# Patient Record
Sex: FEMALE | Race: OTHER | NOT HISPANIC OR LATINO | Employment: FULL TIME | URBAN - METROPOLITAN AREA
[De-identification: names, ages, dates, MRNs, and addresses within clinical notes are randomized per-mention and may not be internally consistent; named-entity substitution may affect disease eponyms.]

---

## 2023-08-30 ENCOUNTER — TELEPHONE (OUTPATIENT)
Dept: CARDIOLOGY | Facility: HOSPITAL | Age: 43
End: 2023-08-30

## 2023-10-17 PROBLEM — R25.3 BENIGN FASCICULATIONS: Status: ACTIVE | Noted: 2023-10-17

## 2023-10-17 PROBLEM — G51.32 HEMIFACIAL SPASM OF LEFT SIDE OF FACE: Status: ACTIVE | Noted: 2023-10-17

## 2023-10-17 PROBLEM — G51.4 EYELID MYOKYMIA: Status: ACTIVE | Noted: 2023-10-17

## 2023-10-17 PROBLEM — G24.5 BLEPHAROSPASM: Status: ACTIVE | Noted: 2023-10-17

## 2023-10-17 PROBLEM — R79.89 LOW VITAMIN D LEVEL: Status: ACTIVE | Noted: 2023-10-17

## 2023-10-17 PROBLEM — H93.19 TINNITUS: Status: ACTIVE | Noted: 2023-10-17

## 2023-10-17 PROBLEM — R92.8 ABNORMAL MAMMOGRAM: Status: ACTIVE | Noted: 2023-10-17

## 2023-10-17 RX ORDER — CHOLECALCIFEROL (VITAMIN D3) 50 MCG
50 TABLET ORAL
COMMUNITY

## 2023-10-17 RX ORDER — BACLOFEN 10 MG/1
TABLET ORAL
COMMUNITY
Start: 2022-09-01 | End: 2024-03-04 | Stop reason: WASHOUT

## 2023-10-17 RX ORDER — AMOXICILLIN AND CLAVULANATE POTASSIUM 500; 125 MG/1; MG/1
1 TABLET, FILM COATED ORAL 2 TIMES DAILY
COMMUNITY
Start: 2023-03-18

## 2023-10-18 ENCOUNTER — PROCEDURE VISIT (OUTPATIENT)
Dept: NEUROLOGY | Facility: CLINIC | Age: 43
End: 2023-10-18
Payer: COMMERCIAL

## 2023-10-18 VITALS — BODY MASS INDEX: 29.45 KG/M2 | WEIGHT: 161 LBS

## 2023-10-18 DIAGNOSIS — G51.32 HEMIFACIAL SPASM OF LEFT SIDE OF FACE: Primary | ICD-10-CM

## 2023-10-18 PROCEDURE — 67345 DESTROY NERVE OF EYE MUSCLE: CPT | Performed by: STUDENT IN AN ORGANIZED HEALTH CARE EDUCATION/TRAINING PROGRAM

## 2023-10-18 NOTE — PROGRESS NOTES
Neurology Botulinum Injection    Subjective   Chata Vo is an 43 y.o. female here for medical botulinum injection for hemifacial spasm. Did ok last time with some lower face asymetry.    Objective   Alert oriented x4, extraocular in full, saccades normal, VOR normal, pursuit normal.  Face is somewhat asymmetric with hemifacial spasm primarily affecting her left eyelid minimal extent to face today.  No weakness noted.  VOR intact.  Visual field full.  Power intact in all 4 extremities proximally and distally.  Sensations intact in all 4 extremities proximally and distally.  No abnormal involuntary limb movements gait unremarkable.  Reflexes symmetric.  -  Procedures  After consent procedure was completed and risk and benefits were explained we injected botulinum toxin using 13-gauge half an inch needle.  We used Xeomin.  Injected 2.5 units each at three different location on top of the both eyebrows that is above the upper edge of the eyebrow.  Injected 2.5 units on the lateral aspect of the lateral canthus on each side and 2.5 units on the lateral inferior aspect of the lateral canthus on each side.  2.5 units on each  was injected.Lower face was skipped at this time.  Assessment/Plan   The patient is a 43-year-old woman with a history of hemifacial spasm here for botulinum toxin injections.  She did well last time in regards to the face however there were some residual spasms.  We injected upper face at slightly different regimen as noted above.  Lower face was given at this time.  We will follow through in 3 months.

## 2023-11-09 ENCOUNTER — PHARMACY VISIT (OUTPATIENT)
Dept: PHARMACY | Facility: CLINIC | Age: 43
End: 2023-11-09
Payer: COMMERCIAL

## 2023-12-27 ENCOUNTER — TELEPHONE (OUTPATIENT)
Dept: OBSTETRICS AND GYNECOLOGY | Facility: CLINIC | Age: 43
End: 2023-12-27
Payer: COMMERCIAL

## 2023-12-27 DIAGNOSIS — R92.8 ABNORMAL MAMMOGRAM: Primary | ICD-10-CM

## 2024-01-09 ENCOUNTER — SPECIALTY PHARMACY (OUTPATIENT)
Dept: PHARMACY | Facility: CLINIC | Age: 44
End: 2024-01-09

## 2024-01-09 PROCEDURE — RXMED WILLOW AMBULATORY MEDICATION CHARGE

## 2024-01-10 ENCOUNTER — PHARMACY VISIT (OUTPATIENT)
Dept: PHARMACY | Facility: CLINIC | Age: 44
End: 2024-01-10
Payer: COMMERCIAL

## 2024-01-15 ENCOUNTER — SPECIALTY PHARMACY (OUTPATIENT)
Dept: PHARMACY | Facility: CLINIC | Age: 44
End: 2024-01-15

## 2024-01-17 ENCOUNTER — APPOINTMENT (OUTPATIENT)
Dept: NEUROLOGY | Facility: CLINIC | Age: 44
End: 2024-01-17
Payer: COMMERCIAL

## 2024-01-17 ENCOUNTER — PROCEDURE VISIT (OUTPATIENT)
Dept: NEUROLOGY | Facility: CLINIC | Age: 44
End: 2024-01-17
Payer: COMMERCIAL

## 2024-01-17 DIAGNOSIS — G51.32 HEMIFACIAL SPASM OF LEFT SIDE OF FACE: Primary | ICD-10-CM

## 2024-01-17 PROCEDURE — 64615 CHEMODENERV MUSC MIGRAINE: CPT | Performed by: STUDENT IN AN ORGANIZED HEALTH CARE EDUCATION/TRAINING PROGRAM

## 2024-01-17 PROCEDURE — 99214 OFFICE O/P EST MOD 30 MIN: CPT | Performed by: STUDENT IN AN ORGANIZED HEALTH CARE EDUCATION/TRAINING PROGRAM

## 2024-01-17 NOTE — PROGRESS NOTES
The patient is here for follow up of BoNT for blepherospasm and more so hemifacial spasm on the left side. We initially had tried bilaterally assymetric injections but lately have been doing bilaterally symmetric. Last time skipped lower face, and she was in general happy with the outcome. She did have some lingering spasms, but less frequent and mild.    Exam:  No assymetry, no hamifacial spasm today, minimal with provoking, I looked at videos which showed mostly on the left side movements.  Power intact in all four, sensations intac t in all four. No abnormal involuntary movements, gait normal.    Procedure  Neurology Botulinum Injection    -  Procedures  After consent procedure was completed and risk and benefits were explained we injected botulinum toxin using 13-gauge half an inch needle.  We used Xeomin.  Injected 3.0 units each at three different location on top of the both eyebrows that is above the upper edge of the eyebrow.  Injected 3.0 units on the lateral aspect of the lateral canthus on each side and 3.0 units on the lateral inferior aspect of the lateral canthus on each side.  3.0 units on each  was injected.Lower face was skipped at this time.  Assessment/Plan   The patient is a 43-year-old woman with a history of hemifacial spasm here for botulinum toxin injections.  She did well last time in regards to the face however there were some residual spasms.  We injected upper face at slightly different regimen as noted above, overall she got 3 units instead of 2.5 (20% more).  Lower face was skipped at this time.  We will follow through in 3 months.

## 2024-01-22 ENCOUNTER — HOSPITAL ENCOUNTER (OUTPATIENT)
Dept: RADIOLOGY | Facility: HOSPITAL | Age: 44
Discharge: HOME | End: 2024-01-22

## 2024-01-22 VITALS — WEIGHT: 160 LBS | BODY MASS INDEX: 29.26 KG/M2

## 2024-01-22 DIAGNOSIS — R92.8 ABNORMAL MAMMOGRAM: ICD-10-CM

## 2024-01-22 PROCEDURE — 6100000003 BI MR BREAST BILATERAL WITH CONTRAST FAST SCREENING SELF PAY

## 2024-01-22 RX ORDER — GADOTERATE MEGLUMINE 376.9 MG/ML
15 INJECTION INTRAVENOUS
Status: COMPLETED | OUTPATIENT
Start: 2024-01-22 | End: 2024-01-22

## 2024-01-22 RX ADMIN — GADOTERATE MEGLUMINE 15 ML: 376.9 INJECTION INTRAVENOUS at 07:18

## 2024-01-24 ENCOUNTER — APPOINTMENT (OUTPATIENT)
Dept: NEUROLOGY | Facility: CLINIC | Age: 44
End: 2024-01-24
Payer: COMMERCIAL

## 2024-03-04 ENCOUNTER — OFFICE VISIT (OUTPATIENT)
Dept: OBSTETRICS AND GYNECOLOGY | Facility: CLINIC | Age: 44
End: 2024-03-04
Payer: COMMERCIAL

## 2024-03-04 VITALS
HEART RATE: 71 BPM | SYSTOLIC BLOOD PRESSURE: 129 MMHG | HEIGHT: 62 IN | WEIGHT: 166 LBS | BODY MASS INDEX: 30.55 KG/M2 | DIASTOLIC BLOOD PRESSURE: 88 MMHG

## 2024-03-04 DIAGNOSIS — Z01.419 WELL WOMAN EXAM WITH ROUTINE GYNECOLOGICAL EXAM: Primary | ICD-10-CM

## 2024-03-04 DIAGNOSIS — Z12.31 BREAST CANCER SCREENING BY MAMMOGRAM: ICD-10-CM

## 2024-03-04 PROCEDURE — 99396 PREV VISIT EST AGE 40-64: CPT | Performed by: OBSTETRICS & GYNECOLOGY

## 2024-03-04 PROCEDURE — 1036F TOBACCO NON-USER: CPT | Performed by: OBSTETRICS & GYNECOLOGY

## 2024-03-04 ASSESSMENT — PATIENT HEALTH QUESTIONNAIRE - PHQ9
SUM OF ALL RESPONSES TO PHQ9 QUESTIONS 1 AND 2: 0
2. FEELING DOWN, DEPRESSED OR HOPELESS: NOT AT ALL
1. LITTLE INTEREST OR PLEASURE IN DOING THINGS: NOT AT ALL

## 2024-03-04 ASSESSMENT — ENCOUNTER SYMPTOMS
CARDIOVASCULAR NEGATIVE: 0
EYES NEGATIVE: 0
ENDOCRINE NEGATIVE: 0
MUSCULOSKELETAL NEGATIVE: 0
HEMATOLOGIC/LYMPHATIC NEGATIVE: 0
NEUROLOGICAL NEGATIVE: 0
RESPIRATORY NEGATIVE: 0
ALLERGIC/IMMUNOLOGIC NEGATIVE: 0
GASTROINTESTINAL NEGATIVE: 0
CONSTITUTIONAL NEGATIVE: 0
PSYCHIATRIC NEGATIVE: 0

## 2024-03-04 ASSESSMENT — COLUMBIA-SUICIDE SEVERITY RATING SCALE - C-SSRS
2. HAVE YOU ACTUALLY HAD ANY THOUGHTS OF KILLING YOURSELF?: NO
6. HAVE YOU EVER DONE ANYTHING, STARTED TO DO ANYTHING, OR PREPARED TO DO ANYTHING TO END YOUR LIFE?: NO
1. IN THE PAST MONTH, HAVE YOU WISHED YOU WERE DEAD OR WISHED YOU COULD GO TO SLEEP AND NOT WAKE UP?: NO

## 2024-03-04 ASSESSMENT — PAIN SCALES - GENERAL: PAINLEVEL: 0-NO PAIN

## 2024-03-04 NOTE — PROGRESS NOTES
Assessment   Delmy was seen today for annual exam.  Diagnoses and all orders for this visit:  Well woman exam with routine gynecological exam (Primary)  -     CBC; Future  -     Comprehensive Metabolic Panel; Future  -     Glucose, Fasting; Future  -     Hemoglobin A1C; Future  -     Lipid Panel; Future  -     TSH with reflex to Free T4 if abnormal; Future  -     BI mammo bilateral screening tomosynthesis; Future  -     Colonoscopy Screening; Average Risk Patient; Future  Breast cancer screening by mammogram  -     BI mammo bilateral screening tomosynthesis; Future    RV 1 year    Georgina Campa MD    Subjective   40 YO who presents for an annual gynecological exam. PCP = Vick TONEY Concerns: none     Other Concerns: Pt not interested in starting meds for dysmenorrhea.     OBHx:  2010 39 weeks CS, had uterine bleeding 2/2 fibroid  2013 39 week CS elective repeat     GynHx:  Menstrual Hx: Menarche 12, cycles q28-32, last 5 days with 2 heavier days. Had one episode of IMS this past year. No menorrhagia but does have severe dysmenorrhea. No IMS. No mood swings.  STIs: denies h/o  Contraception:  s/p vasectomy  Sexual History/Complaints: Heterosexual, monogamous, no complaints     PapHx:  Dec 2017 --> ASCUS neg HRHPV   Oct 2020 --> ASCUS neg HRHPV  Nov 2021 --> neg cotest  Jan 2023 --> neg cotest     Preventative:  Last mammogram: Neg fast MRI Jan 2024. Hx right breast asymmetry seen 2020 and 2021 but BIRAD Cat 1. Pt wants to continue fast MRI bc she's had call backs multiple times a year ever since age 39 for her breast asymmetry.   Last Colonoscopy: due age 44  DM Screening: due now  DEXA: due age 64  Immunizations: UTD  Exercise: treadmill 1-2X/week  Diet: no restrictions  Seat Belt Use: always     SoHx:  Living Situation: lives with  and children ([place] [employer])  School/Employment: accepted job at [employer] in  ([place])  Substance: no T/D, social EtOH (1-2 drinks/month)  CAGE: neg  Abuse:  "denies  Depression Screen: negative     PMH, PSH reviewed:  Blepherospasm, left hemifacial spasm on the left side - getting Botox    FamHx: Mother had fibroids, s/p hysterectomy    Objective   /88   Pulse 71   Ht 1.575 m (5' 2\")   Wt 75.3 kg (166 lb)   LMP 02/17/2024 (Approximate)   BMI 30.36 kg/m²      General:   Alert and oriented, in no acute distress   Neck: Supple. No visible thyromegaly.    Breast/Axilla: Normal to palpation bilaterally without masses, skin changes, or nipple discharge.    Abdomen: Soft, non-tender, without masses or organomegaly   Vulva: Normal architecture without erythema, masses, or lesions.    Vagina: Normal mucosa without lesions, masses, or atrophy. No abnormal vaginal discharge.    Cervix: Normal without masses, lesions, or signs of cervicitis.    Uterus: Normal mobile, non-enlarged uterus    Adnexa: Normal without masses or lesions   Pelvic Floor No POP noted. No high tone pelvic floor    Psych Normal affect. Normal mood.        "

## 2024-04-04 PROCEDURE — RXMED WILLOW AMBULATORY MEDICATION CHARGE

## 2024-04-05 ENCOUNTER — SPECIALTY PHARMACY (OUTPATIENT)
Dept: PHARMACY | Facility: CLINIC | Age: 44
End: 2024-04-05

## 2024-04-08 ENCOUNTER — PHARMACY VISIT (OUTPATIENT)
Dept: PHARMACY | Facility: CLINIC | Age: 44
End: 2024-04-08
Payer: COMMERCIAL

## 2024-04-17 ENCOUNTER — PROCEDURE VISIT (OUTPATIENT)
Dept: NEUROLOGY | Facility: CLINIC | Age: 44
End: 2024-04-17
Payer: COMMERCIAL

## 2024-04-17 DIAGNOSIS — G51.32 HEMIFACIAL SPASM OF LEFT SIDE OF FACE: Primary | ICD-10-CM

## 2024-04-17 PROCEDURE — 64612 DESTROY NERVE FACE MUSCLE: CPT | Performed by: STUDENT IN AN ORGANIZED HEALTH CARE EDUCATION/TRAINING PROGRAM

## 2024-04-17 PROCEDURE — 99214 OFFICE O/P EST MOD 30 MIN: CPT | Performed by: STUDENT IN AN ORGANIZED HEALTH CARE EDUCATION/TRAINING PROGRAM

## 2024-04-17 NOTE — PROGRESS NOTES
Subjective   Chata Vo is an 43 y.o. female here for medical botulinum injection for hemifacial spasm. Did ok last time with some lower face asymetry.                    Review of Systems  All other system have been reviewed and are negative for complaint.  Objective   Neurological Exam    Physical ExamAlert oriented x4, extraocular in full, saccades normal, VOR normal, pursuit normal.  Face is somewhat asymmetric with hemifacial spasm primarily affecting her left eyelid minimal extent to face today.  No weakness noted.  VOR intact.  Visual field full.  Power intact in all 4 extremities proximally and distally.  Sensations intact in all 4 extremities proximally and distally.  No abnormal involuntary limb movements gait unremarkable.  Reflexes symmetric.  -  Procedures  After consent procedure was completed and risk and benefits were explained we injected botulinum toxin using 13-gauge half an inch needle.  We used Xeomin.  Injected 3.0 units each at three different location on top of the both eyebrows that is above the upper edge of the eyebrow.  Injected 3.0units on the lateral aspect of the lateral canthus on each side and 3.0 units on the lateral inferior aspect of the lateral canthus on each side.  3.0 units on each  was injected.Lower face was skipped at this time.     Lot;[id number]  Exp:06/2026                                   Assessment/Plan The patient is a 43-year-old woman with a history of hemifacial spasm here for botulinum toxin injections. She did well last time in regards to the face however there were some residual spasms. We injected upper face at slightly different regimen as noted above. . We will follow through in 3 months.

## 2024-07-03 ENCOUNTER — SPECIALTY PHARMACY (OUTPATIENT)
Dept: PHARMACY | Facility: CLINIC | Age: 44
End: 2024-07-03

## 2024-07-03 PROCEDURE — RXMED WILLOW AMBULATORY MEDICATION CHARGE

## 2024-07-09 ENCOUNTER — PHARMACY VISIT (OUTPATIENT)
Dept: PHARMACY | Facility: CLINIC | Age: 44
End: 2024-07-09
Payer: MEDICARE

## 2024-07-17 ENCOUNTER — APPOINTMENT (OUTPATIENT)
Dept: NEUROLOGY | Facility: CLINIC | Age: 44
End: 2024-07-17
Payer: COMMERCIAL

## 2024-07-17 DIAGNOSIS — G51.32 HEMIFACIAL SPASM OF LEFT SIDE OF FACE: Primary | ICD-10-CM

## 2024-07-17 NOTE — PROGRESS NOTES
Subjective   Chata Vo is an 43 y.o. female here for medical botulinum injection for hemifacial spasm. Patient requesting today to avoid the lower face due to smile asymmetry from last injections.       Review of Systems  All other system have been reviewed and are negative for complaint.  Objective   Neurological Exam    Physical ExamAlert oriented x4, extraocular in full, saccades normal, VOR normal, pursuit normal.  Some hemifacial spasm primarily affecting her left eyelid minimal extent to face today.  No weakness noted.  VOR intact.  Visual field full.  Power intact in all 4 extremities proximally and distally.  Sensations intact in all 4 extremities proximally and distally.  No abnormal involuntary limb movements gait unremarkable.  Reflexes symmetric.  -  Procedures  After consent procedure was completed and risk and benefits were explained we injected botulinum toxin using 13-gauge half an inch needle.  We used Xeomin.  Injected 5.0 units each at three different location on top of the both eyebrows that is above the upper edge of the eyebrow.  Injected 5.0units on the lateral aspect of the lateral canthus on each side and 5.0 units on each  was injected. Lower face was skipped at this time.     Lot; [id number]  Exp: 06/2026    Assessment/Plan The patient is a 44-year-old woman with a history of hemifacial spasm here for botulinum toxin injections. She did well last time in regards to the face.  We injected upper face as noted above. We will follow through in 3 months.

## 2024-09-18 ENCOUNTER — SPECIALTY PHARMACY (OUTPATIENT)
Dept: PHARMACY | Facility: CLINIC | Age: 44
End: 2024-09-18

## 2024-09-27 ENCOUNTER — TELEPHONE (OUTPATIENT)
Dept: NEUROLOGY | Facility: CLINIC | Age: 44
End: 2024-09-27
Payer: COMMERCIAL

## 2024-09-27 NOTE — TELEPHONE ENCOUNTER
Spoke with Kaylee ref # [id number] @[facility] [phone]... Informed me to send script to [facility] for Xeomin ... PA is patient supplied and they will deliver medication to facility.. Thank you

## 2024-09-30 DIAGNOSIS — G51.32 HEMIFACIAL SPASM OF LEFT SIDE OF FACE: Primary | ICD-10-CM

## 2024-10-04 DIAGNOSIS — E55.9 VITAMIN D DEFICIENCY: Primary | ICD-10-CM

## 2024-10-16 ENCOUNTER — APPOINTMENT (OUTPATIENT)
Dept: NEUROLOGY | Facility: CLINIC | Age: 44
End: 2024-10-16
Payer: COMMERCIAL

## 2024-10-16 DIAGNOSIS — G51.32 HEMIFACIAL SPASM OF LEFT SIDE OF FACE: Primary | ICD-10-CM

## 2024-10-16 PROCEDURE — 64612 DESTROY NERVE FACE MUSCLE: CPT | Performed by: STUDENT IN AN ORGANIZED HEALTH CARE EDUCATION/TRAINING PROGRAM

## 2024-10-16 PROCEDURE — 99213 OFFICE O/P EST LOW 20 MIN: CPT | Performed by: STUDENT IN AN ORGANIZED HEALTH CARE EDUCATION/TRAINING PROGRAM

## 2024-10-16 NOTE — PROGRESS NOTES
Chata Vo is an 44 y.o. female here for medical botulinum injection for hemifacial spasm. Patient requesting today to avoid the lower face due to smile asymmetry from last injections.        Review of Systems  All other system have been reviewed and are negative for complaint.     Neurological Exam     Physical ExamAlert oriented x4, extraocular in full, saccades normal, VOR normal, pursuit normal.  Some hemifacial spasm primarily affecting her left eyelid minimal extent to face today.  No weakness noted.  VOR intact.  Visual field full.  Power intact in all 4 extremities proximally and distally.  Sensations intact in all 4 extremities proximally and distally.  No abnormal involuntary limb movements gait unremarkable.  Reflexes symmetric.  -  Procedures  After consent procedure was completed and risk and benefits were explained we injected botulinum toxin using 13-gauge half an inch needle.  We used Xeomin.  Injected 5.0 units each at three different location on top of the both eyebrows that is above the upper edge of the eyebrow.  Injected 5.0units on the lateral aspect of the lateral canthus on each side and 5.0 units on each  was injected. Lower face was skipped at this time.        Assessment/Plan  The patient is a 44-year-old woman with a history of hemifacial spasm here for botulinum toxin injections. She did well last time in regards to the face.  We injected upper face as noted above. We will follow through in 3 months.

## 2024-11-11 ENCOUNTER — APPOINTMENT (OUTPATIENT)
Dept: GASTROENTEROLOGY | Facility: HOSPITAL | Age: 44
End: 2024-11-11
Payer: COMMERCIAL

## 2024-12-02 ENCOUNTER — LAB (OUTPATIENT)
Dept: LAB | Facility: LAB | Age: 44
End: 2024-12-02
Payer: COMMERCIAL

## 2024-12-02 DIAGNOSIS — E55.9 VITAMIN D DEFICIENCY: ICD-10-CM

## 2024-12-02 DIAGNOSIS — Z01.419 WELL WOMAN EXAM WITH ROUTINE GYNECOLOGICAL EXAM: ICD-10-CM

## 2024-12-02 DIAGNOSIS — Z01.419 WELL WOMAN EXAM WITH ROUTINE GYNECOLOGICAL EXAM: Primary | ICD-10-CM

## 2024-12-02 LAB
25(OH)D3 SERPL-MCNC: 69 NG/ML (ref 30–100)
ERYTHROCYTE [DISTWIDTH] IN BLOOD BY AUTOMATED COUNT: 13.1 % (ref 11.5–14.5)
EST. AVERAGE GLUCOSE BLD GHB EST-MCNC: 100 MG/DL
HBA1C MFR BLD: 5.1 %
HCT VFR BLD AUTO: 39.5 % (ref 36–46)
HGB BLD-MCNC: 13.2 G/DL (ref 12–16)
MCH RBC QN AUTO: 29.5 PG (ref 26–34)
MCHC RBC AUTO-ENTMCNC: 33.4 G/DL (ref 32–36)
MCV RBC AUTO: 88 FL (ref 80–100)
NRBC BLD-RTO: 0 /100 WBCS (ref 0–0)
PLATELET # BLD AUTO: 230 X10*3/UL (ref 150–450)
RBC # BLD AUTO: 4.48 X10*6/UL (ref 4–5.2)
TSH SERPL-ACNC: 2.14 MIU/L (ref 0.44–3.98)
WBC # BLD AUTO: 4.8 X10*3/UL (ref 4.4–11.3)

## 2024-12-02 PROCEDURE — 36415 COLL VENOUS BLD VENIPUNCTURE: CPT

## 2024-12-02 PROCEDURE — 82947 ASSAY GLUCOSE BLOOD QUANT: CPT

## 2024-12-02 PROCEDURE — 82306 VITAMIN D 25 HYDROXY: CPT

## 2024-12-02 PROCEDURE — 80053 COMPREHEN METABOLIC PANEL: CPT

## 2024-12-02 PROCEDURE — 84443 ASSAY THYROID STIM HORMONE: CPT

## 2024-12-02 PROCEDURE — 80061 LIPID PANEL: CPT

## 2024-12-02 PROCEDURE — 85027 COMPLETE CBC AUTOMATED: CPT

## 2024-12-02 PROCEDURE — 83036 HEMOGLOBIN GLYCOSYLATED A1C: CPT

## 2024-12-03 LAB
ALBUMIN SERPL BCP-MCNC: 4.3 G/DL (ref 3.4–5)
ALP SERPL-CCNC: 46 U/L (ref 33–110)
ALT SERPL W P-5'-P-CCNC: 12 U/L (ref 7–45)
ANION GAP SERPL CALC-SCNC: 13 MMOL/L (ref 10–20)
AST SERPL W P-5'-P-CCNC: 17 U/L (ref 9–39)
BILIRUB SERPL-MCNC: 0.5 MG/DL (ref 0–1.2)
BUN SERPL-MCNC: 11 MG/DL (ref 6–23)
CALCIUM SERPL-MCNC: 8.8 MG/DL (ref 8.6–10.3)
CHLORIDE SERPL-SCNC: 106 MMOL/L (ref 98–107)
CHOLEST SERPL-MCNC: 188 MG/DL (ref 0–199)
CHOLESTEROL/HDL RATIO: 3.2
CO2 SERPL-SCNC: 23 MMOL/L (ref 21–32)
CREAT SERPL-MCNC: 0.73 MG/DL (ref 0.5–1.05)
EGFRCR SERPLBLD CKD-EPI 2021: >90 ML/MIN/1.73M*2
GLUCOSE P FAST SERPL-MCNC: 86 MG/DL (ref 74–99)
GLUCOSE SERPL-MCNC: 86 MG/DL (ref 74–99)
HDLC SERPL-MCNC: 59.1 MG/DL
LDLC SERPL CALC-MCNC: 112 MG/DL
NON HDL CHOLESTEROL: 129 MG/DL (ref 0–149)
POTASSIUM SERPL-SCNC: 4.1 MMOL/L (ref 3.5–5.3)
PROT SERPL-MCNC: 6.9 G/DL (ref 6.4–8.2)
SODIUM SERPL-SCNC: 138 MMOL/L (ref 136–145)
TRIGL SERPL-MCNC: 86 MG/DL (ref 0–149)
VLDL: 17 MG/DL (ref 0–40)

## 2024-12-08 ENCOUNTER — ANESTHESIA EVENT (OUTPATIENT)
Dept: OPERATING ROOM | Facility: HOSPITAL | Age: 44
End: 2024-12-08
Payer: COMMERCIAL

## 2024-12-08 RX ORDER — ONDANSETRON HYDROCHLORIDE 2 MG/ML
8 INJECTION, SOLUTION INTRAVENOUS ONCE
Status: CANCELLED | OUTPATIENT
Start: 2024-12-08 | End: 2024-12-08

## 2024-12-08 RX ORDER — ALBUTEROL SULFATE 0.83 MG/ML
2.5 SOLUTION RESPIRATORY (INHALATION) ONCE AS NEEDED
Status: CANCELLED | OUTPATIENT
Start: 2024-12-08

## 2024-12-08 RX ORDER — ACETAMINOPHEN 325 MG/1
650 TABLET ORAL EVERY 4 HOURS PRN
Status: CANCELLED | OUTPATIENT
Start: 2024-12-08

## 2024-12-09 ENCOUNTER — HOSPITAL ENCOUNTER (OUTPATIENT)
Dept: OPERATING ROOM | Facility: HOSPITAL | Age: 44
Setting detail: OUTPATIENT SURGERY
Discharge: HOME | End: 2024-12-09
Payer: COMMERCIAL

## 2024-12-09 ENCOUNTER — ANESTHESIA (OUTPATIENT)
Dept: OPERATING ROOM | Facility: HOSPITAL | Age: 44
End: 2024-12-09
Payer: COMMERCIAL

## 2024-12-09 VITALS
DIASTOLIC BLOOD PRESSURE: 79 MMHG | SYSTOLIC BLOOD PRESSURE: 109 MMHG | OXYGEN SATURATION: 98 % | TEMPERATURE: 97.2 F | RESPIRATION RATE: 12 BRPM | WEIGHT: 163.03 LBS | HEART RATE: 70 BPM | HEIGHT: 62 IN | BODY MASS INDEX: 30 KG/M2

## 2024-12-09 DIAGNOSIS — Z01.419 WELL WOMAN EXAM WITH ROUTINE GYNECOLOGICAL EXAM: ICD-10-CM

## 2024-12-09 LAB — PREGNANCY TEST URINE, POC: NEGATIVE

## 2024-12-09 PROCEDURE — 45378 DIAGNOSTIC COLONOSCOPY: CPT | Performed by: SURGERY

## 2024-12-09 PROCEDURE — 7100000010 HC PHASE TWO TIME - EACH INCREMENTAL 1 MINUTE: Performed by: ANESTHESIOLOGY

## 2024-12-09 PROCEDURE — 3700000001 HC GENERAL ANESTHESIA TIME - INITIAL BASE CHARGE: Performed by: ANESTHESIOLOGY

## 2024-12-09 PROCEDURE — A45378 PR COLONOSCOPY,DIAGNOSTIC: Performed by: NURSE ANESTHETIST, CERTIFIED REGISTERED

## 2024-12-09 PROCEDURE — A45378 PR COLONOSCOPY,DIAGNOSTIC: Performed by: ANESTHESIOLOGY

## 2024-12-09 PROCEDURE — 3700000002 HC GENERAL ANESTHESIA TIME - EACH INCREMENTAL 1 MINUTE: Performed by: ANESTHESIOLOGY

## 2024-12-09 PROCEDURE — 81025 URINE PREGNANCY TEST: CPT | Performed by: ANESTHESIOLOGY

## 2024-12-09 PROCEDURE — 3600000002 HC OR TIME - INITIAL BASE CHARGE - PROCEDURE LEVEL TWO: Performed by: ANESTHESIOLOGY

## 2024-12-09 PROCEDURE — 2500000004 HC RX 250 GENERAL PHARMACY W/ HCPCS (ALT 636 FOR OP/ED): Performed by: NURSE ANESTHETIST, CERTIFIED REGISTERED

## 2024-12-09 PROCEDURE — 3600000007 HC OR TIME - EACH INCREMENTAL 1 MINUTE - PROCEDURE LEVEL TWO: Performed by: ANESTHESIOLOGY

## 2024-12-09 PROCEDURE — 7100000009 HC PHASE TWO TIME - INITIAL BASE CHARGE: Performed by: ANESTHESIOLOGY

## 2024-12-09 RX ORDER — LIDOCAINE HCL/PF 100 MG/5ML
SYRINGE (ML) INTRAVENOUS AS NEEDED
Status: DISCONTINUED | OUTPATIENT
Start: 2024-12-09 | End: 2024-12-09

## 2024-12-09 RX ORDER — MIDAZOLAM HYDROCHLORIDE 1 MG/ML
INJECTION INTRAMUSCULAR; INTRAVENOUS AS NEEDED
Status: DISCONTINUED | OUTPATIENT
Start: 2024-12-09 | End: 2024-12-09

## 2024-12-09 RX ORDER — FENTANYL CITRATE 50 UG/ML
INJECTION, SOLUTION INTRAMUSCULAR; INTRAVENOUS AS NEEDED
Status: DISCONTINUED | OUTPATIENT
Start: 2024-12-09 | End: 2024-12-09

## 2024-12-09 RX ORDER — PROPOFOL 10 MG/ML
INJECTION, EMULSION INTRAVENOUS AS NEEDED
Status: DISCONTINUED | OUTPATIENT
Start: 2024-12-09 | End: 2024-12-09

## 2024-12-09 SDOH — HEALTH STABILITY: MENTAL HEALTH: CURRENT SMOKER: 0

## 2024-12-09 ASSESSMENT — PAIN SCALES - GENERAL
PAINLEVEL_OUTOF10: 0 - NO PAIN
PAIN_LEVEL: 2

## 2024-12-09 ASSESSMENT — PAIN - FUNCTIONAL ASSESSMENT
PAIN_FUNCTIONAL_ASSESSMENT: 0-10

## 2024-12-09 NOTE — H&P
General Surgery History and Physical    Referring Provider:  MD Lokesh Camarillo Lulu X, MD     Chief Complaint:  Colonoscopy    History of Present Illness:  This is a 44 y.o. female who presents for a colonoscopy.    Past Medical History:  Past Medical History:   Diagnosis Date    COVID-19 10/27/2022    COVID-19    Personal history of other medical treatment 2022    History of screening mammography        Past Surgical History:  Past Surgical History:   Procedure Laterality Date    OTHER SURGICAL HISTORY  10/21/2022     section        Medications:  Current Outpatient Medications   Medication Instructions    cholecalciferol (VITAMIN D-3) 50 mcg, oral    incobotulinumtoxinA (Xeomin) 100 Units recon soln PHYSICIAN TO INJECT 100 UNITS TOTAL INTRAMUSCULARLY EVERY 90 DAYS. FOR  ONLY.        Allergies:  No Known Allergies     Family History:  Family History   Problem Relation Name Age of Onset    Other (benign essential hypertension) Father          Social History:  Social History     Socioeconomic History    Marital status:    Tobacco Use    Smoking status: Never    Smokeless tobacco: Never        Review of Systems:  A complete 12 point review of systems was performed and is negative except as noted in the history of present illness.      Vital Signs:  Vitals:    24 0713   BP: (!) 136/91   Pulse: 80   Resp: 16   Temp: 36.6 °C (97.9 °F)   SpO2: 98%          Physical Exam:  General: No acute distress. Sitting up in bed.   Neuro: Alert and oriented ×3. Follows commands.  Head: Atraumatic  Eyes: Pupils equal reactive to light. Extraocular motions intact.  Ears: Hears normal speaking voice.  Mouth, Nose, Throat: Mucous membranes moist.  Normal dentition.  Neck: Supple. No appreciable masses.  Chest: No crepitus.    Heart: Regular rate and rhythm.  Lung: Clear to auscultation bilaterally.  Vascular: No carotid bruits.  Palpable radial pulses bilaterally.  Abdomen: Soft.  "Nondistended. Nontender.    Musculoskeletal: Moves all extremities.  Normal range of motion.  Lymphatic: No palpable lymph nodes.  Skin: No rashes or lesions.  Psychological: Normal affect      Laboratory Values:  CBC: No results found for: \"WBC\", \"RBC\", \"HGB\", \"HCT\", \"PLT\"    RFP: No results found for: \"GLUF\", \"NA\", \"K\", \"CL\", \"CO2\", \"BUN\", \"CREATININE\", \"CALCIUM\", \"MG\", \"PHOS\"     LFTs: No results found for: \"PROT\", \"ALBUMIN\", \"BILITOT\", \"BILIDIR\", \"ALKPHOS\", \"AST\", \"ALT\"         Assessment:  This is a 44 y.o. female who presents for a colonoscopy.      Plan:  -- Colonoscopy.      Tye Gonzales MD  General Surgery  Office: [phone]  Fax:     [phone]  8:12 AM   12/09/24     "

## 2024-12-09 NOTE — ANESTHESIA POSTPROCEDURE EVALUATION
"Patient: Delmy Vo \"Chata\"    Procedure Summary       Date: 12/09/24 Room / Location: [facility] OR    Anesthesia Start: 0818 Anesthesia Stop: 0838    Procedure: COLONOSCOPY Diagnosis: Well woman exam with routine gynecological exam    Scheduled Providers: Jeancarlos Gonzales MD; Albaro Knox MD Responsible Provider: Albaro Knox MD    Anesthesia Type: MAC ASA Status: 2            Anesthesia Type: MAC    Vitals Value Taken Time   /79 12/09/24 0845   Temp 36.2 °C (97.2 °F) 12/09/24 0835   Pulse 70 12/09/24 0845   Resp 12 12/09/24 0835   SpO2 98 % 12/09/24 0845       Anesthesia Post Evaluation    Patient location during evaluation: PACU  Patient participation: complete - patient participated  Level of consciousness: awake  Pain score: 2  Pain management: adequate  Multimodal analgesia pain management approach  Airway patency: patent  Two or more strategies used to mitigate risk of obstructive sleep apnea  Cardiovascular status: acceptable  Respiratory status: acceptable  Hydration status: acceptable  Postoperative Nausea and Vomiting: none    There were no known notable events for this encounter.    "

## 2024-12-09 NOTE — ANESTHESIA PREPROCEDURE EVALUATION
"Patient: Delmy Vo \"Chata\"    Procedure Information       Anesthesia Start Date/Time: 2418    Scheduled providers: Jeancarlos Gonzales MD; Albaro Knox MD    Procedure: COLONOSCOPY    Location: [facility] OR     Vitals:    24 0845   BP: 109/79   Pulse: 70   Resp:    Temp:    SpO2:        Past Surgical History:   Procedure Laterality Date   • OTHER SURGICAL HISTORY  10/21/2022     section     Past Medical History:   Diagnosis Date   • COVID-19 10/27/2022    COVID-19   • Personal history of other medical treatment 2022    History of screening mammography       Current Outpatient Medications:   •  cholecalciferol (Vitamin D-3) 50 MCG (2000 UT) tablet, Take 1 tablet (50 mcg) by mouth., Disp: , Rfl:   •  incobotulinumtoxinA (Xeomin) 100 Units recon soln, PHYSICIAN TO INJECT 100 UNITS TOTAL INTRAMUSCULARLY EVERY 90 DAYS. FOR  ONLY., Disp: 1 each, Rfl: 3  No current facility-administered medications for this encounter.  Prior to Admission medications    Medication Sig Start Date End Date Taking? Authorizing Provider   cholecalciferol (Vitamin D-3) 50 MCG ( UT) tablet Take 1 tablet (50 mcg) by mouth.    Historical Provider, MD   incobotulinumtoxinA (Xeomin) 100 Units recon soln PHYSICIAN TO INJECT 100 UNITS TOTAL INTRAMUSCULARLY EVERY 90 DAYS. FOR  ONLY. 24  Severine L Kako, MD   amoxicillin-pot clavulanate (Augmentin) 500-125 mg tablet Take 1 tablet (500 mg) by mouth 2 times a day. 3/18/23 12/9/24  Historical Provider, MD     No Known Allergies  Social History     Tobacco Use   • Smoking status: Never   • Smokeless tobacco: Never   Substance Use Topics   • Alcohol use: Not on file         Chemistry    Lab Results   Component Value Date/Time     2024 0837    K 4.1 2024 0837     2024 0837    CO2 23 2024 0837    BUN 11 2024 0837    CREATININE 0.73 2024 0837    Lab " "Results   Component Value Date/Time    CALCIUM 8.8 12/02/2024 0837    ALKPHOS 46 12/02/2024 0837    AST 17 12/02/2024 0837    ALT 12 12/02/2024 0837    BILITOT 0.5 12/02/2024 0837          Lab Results   Component Value Date/Time    WBC 4.8 12/02/2024 0837    HGB 13.2 12/02/2024 0837    HCT 39.5 12/02/2024 0837     12/02/2024 0837     No results found for: \"PROTIME\", \"PTT\", \"INR\"  No results found for this or any previous visit (from the past 4464 hours).  No results found for this or any previous visit from the past 1095 days.        Relevant Problems   Neuro   (+) Hemifacial spasm of left side of face       Clinical information reviewed:    Allergies  Meds     OB Status           NPO Detail:  NPO/Void Status  Date of Last Liquid: 12/09/24  Time of Last Liquid: 0200  Date of Last Solid: 12/07/24         Physical Exam    Airway  Mallampati: II  TM distance: >3 FB     Cardiovascular - normal exam     Dental    Pulmonary - normal exam     Abdominal - normal exam         Anesthesia Plan    History of general anesthesia?: yes  History of complications of general anesthesia?: no    ASA 2     MAC     The patient is not a current smoker.    intravenous induction   Anesthetic plan and risks discussed with patient.    Plan discussed with CRNA and attending.  "

## 2024-12-09 NOTE — DISCHARGE INSTRUCTIONS
Patient Instructions after a Colonoscopy      The anesthetics, sedatives or narcotics which were given to you today will be acting in your body for the next 24 hours, so you might feel a little sleepy or groggy.  This feeling should slowly wear off. Carefully read and follow the instructions.     You received sedation today:  - Do not drive or operate any machinery or power tools of any kind.   - No alcoholic beverages today, not even beer or wine.  - Do not make any important decisions or sign any legal documents.  - No over the counter medications that contain alcohol or that may cause drowsiness.  - Do not make any important decisions or sign any legal documents.  - Make sure you have someone with you for first 24 hours.    While it is common to experience mild to moderate abdominal distention, gas, or belching after your procedure, if any of these symptoms occur following discharge from the GI Lab or within one week of having your procedure, call the [facility] to be advised whether a visit to your nearest Urgent Care or Emergency Department is indicated.  Take this paper with you if you go.     - If you develop an allergic reaction to the medications that were given during your procedure such as difficulty breathing, rash, hives, severe nausea, vomiting or lightheadedness.  - If you experience chest pain, shortness of breath, severe abdominal pain, fevers and chills.  -If you develop signs and symptoms of bleeding such as blood in your spit, if your stools turn black, tarry, or bloody  - If you have not urinated within 8 hours following your procedure.  - If your IV site becomes painful, red, inflamed, or looks infected.    If you received a biopsy/polypectomy/sphincterotomy the following instructions apply below:    __ Do not use Aspirin containing products, non-steroidal medications or anti-coagulants for one week following your procedure. (Examples of these types of medications are: Advil,  Arthrotec, Aleve, Coumadin, Ecotrin, Heparin, Ibuprofen, Indocin, Motrin, Naprosyn, Nuprin, Plavix, Vioxx, and Voltarin, or their generic forms.  This list is not all-inclusive.  Check with your physician or pharmacist before resuming medications.)   __ Eat a soft diet today.  Avoid foods that are poorly digested for the next 24 hours.  These foods would include: nuts, beans, lettuce, red meats, and fried foods. Start with liquids and advance your diet as tolerated, gradually work up to eating solids.   __ Do not have a Barium Study or Enema for one week.    Your physician recommends the additional following instructions:    -You have a contact number available for emergencies. The signs and symptoms of potential delayed complications were discussed with you. You may return to normal activities tomorrow.  -Resume your previous diet.  -Continue your present medications.   -We are waiting for your pathology results.  -Your physician has recommended a repeat colonoscopy (date to be determined after pending pathology results are reviewed) for surveillance based on pathology results.  -The findings and recommendations have been discussed with you.  -The findings and recommendations were discussed with your family.  - Please see Medication Reconciliation Form for new medication/medications prescribed.       If you experience any problems or have any questions following discharge from the GI Lab, please call:        Nurse Signature                                                                        Date___________________                                                                            Patient/Responsible Party Signature                                        Date___________________

## 2025-01-15 ENCOUNTER — APPOINTMENT (OUTPATIENT)
Dept: NEUROLOGY | Facility: CLINIC | Age: 45
End: 2025-01-15
Payer: COMMERCIAL

## 2025-01-23 ENCOUNTER — APPOINTMENT (OUTPATIENT)
Dept: NEUROLOGY | Facility: CLINIC | Age: 45
End: 2025-01-23
Payer: COMMERCIAL

## 2025-01-23 DIAGNOSIS — G51.32 HEMIFACIAL SPASM OF LEFT SIDE OF FACE: Primary | ICD-10-CM

## 2025-03-05 ENCOUNTER — APPOINTMENT (OUTPATIENT)
Dept: NEUROLOGY | Facility: CLINIC | Age: 45
End: 2025-03-05
Payer: COMMERCIAL

## 2025-04-04 ENCOUNTER — HOSPITAL ENCOUNTER (OUTPATIENT)
Dept: RADIOLOGY | Facility: CLINIC | Age: 45
Discharge: HOME | End: 2025-04-04
Payer: COMMERCIAL

## 2025-04-04 DIAGNOSIS — Z01.419 WELL WOMAN EXAM WITH ROUTINE GYNECOLOGICAL EXAM: ICD-10-CM

## 2025-04-04 PROCEDURE — 75571 CT HRT W/O DYE W/CA TEST: CPT

## 2025-04-30 ENCOUNTER — APPOINTMENT (OUTPATIENT)
Dept: NEUROLOGY | Facility: CLINIC | Age: 45
End: 2025-04-30
Payer: COMMERCIAL

## 2025-04-30 DIAGNOSIS — G51.32 HEMIFACIAL SPASM OF LEFT SIDE OF FACE: Primary | ICD-10-CM

## 2025-04-30 PROCEDURE — 64612 DESTROY NERVE FACE MUSCLE: CPT | Performed by: STUDENT IN AN ORGANIZED HEALTH CARE EDUCATION/TRAINING PROGRAM

## 2025-04-30 PROCEDURE — 99215 OFFICE O/P EST HI 40 MIN: CPT | Performed by: STUDENT IN AN ORGANIZED HEALTH CARE EDUCATION/TRAINING PROGRAM

## 2025-04-30 NOTE — PROGRESS NOTES
Chata Vo is an 44 y.o. female here for medical botulinum injection for hemifacial spasm. Patient requesting today to avoid the lower face due to smile asymmetry from last injections.        Review of Systems  All other system have been reviewed and are negative for complaint.     Neurological Exam     Physical ExamAlert oriented x4, extraocular in full, saccades normal, VOR normal, pursuit normal.  Some hemifacial spasm primarily affecting her left eyelid minimal extent to face today.  No weakness noted.  VOR intact.  Visual field full.  Power intact in all 4 extremities proximally and distally.  Sensations intact in all 4 extremities proximally and distally.  No abnormal involuntary limb movements gait unremarkable.  Reflexes symmetric.  -  Procedures  After consent procedure was completed and risk and benefits were explained we injected botulinum toxin using 13-gauge half an inch needle.  We used Xeomin.  Injected 5.0 units each at three different location on top of the both eyebrows that is above the upper edge of the eyebrow.  Injected 5.0units on the lateral aspect of the lateral canthus on each side and 5.0 units on each  was injected. 5.0 units in inferior lateral aspectg of each eyes        Assessment/Plan  The patient is a 44-year-old woman with a history of hemifacial spasm here for botulinum toxin injections. She did well last time in regards to the face.  We injected upper face as noted above. We will follow through in 3 months.          This was a shared visit with the [facility]. I reviewed and verified the documentation and independently performed the documented:

## 2025-05-13 ENCOUNTER — APPOINTMENT (OUTPATIENT)
Dept: OBSTETRICS AND GYNECOLOGY | Facility: CLINIC | Age: 45
End: 2025-05-13
Payer: COMMERCIAL

## 2025-05-13 VITALS
HEIGHT: 62 IN | SYSTOLIC BLOOD PRESSURE: 126 MMHG | DIASTOLIC BLOOD PRESSURE: 87 MMHG | WEIGHT: 137 LBS | BODY MASS INDEX: 25.21 KG/M2

## 2025-05-13 DIAGNOSIS — Z01.419 WELL WOMAN EXAM: Primary | ICD-10-CM

## 2025-05-13 PROCEDURE — 3008F BODY MASS INDEX DOCD: CPT | Performed by: ADVANCED PRACTICE MIDWIFE

## 2025-05-13 PROCEDURE — 99396 PREV VISIT EST AGE 40-64: CPT | Performed by: ADVANCED PRACTICE MIDWIFE

## 2025-05-13 PROCEDURE — 1036F TOBACCO NON-USER: CPT | Performed by: ADVANCED PRACTICE MIDWIFE

## 2025-05-13 SDOH — ECONOMIC STABILITY: FOOD INSECURITY: WITHIN THE PAST 12 MONTHS, THE FOOD YOU BOUGHT JUST DIDN'T LAST AND YOU DIDN'T HAVE MONEY TO GET MORE.: NEVER TRUE

## 2025-05-13 SDOH — ECONOMIC STABILITY: FOOD INSECURITY: WITHIN THE PAST 12 MONTHS, YOU WORRIED THAT YOUR FOOD WOULD RUN OUT BEFORE YOU GOT MONEY TO BUY MORE.: NEVER TRUE

## 2025-05-13 SDOH — ECONOMIC STABILITY: INCOME INSECURITY: IN THE LAST 12 MONTHS, WAS THERE A TIME WHEN YOU WERE NOT ABLE TO PAY THE MORTGAGE OR RENT ON TIME?: NO

## 2025-05-13 SDOH — ECONOMIC STABILITY: TRANSPORTATION INSECURITY
IN THE PAST 12 MONTHS, HAS LACK OF TRANSPORTATION KEPT YOU FROM MEETINGS, WORK, OR FROM GETTING THINGS NEEDED FOR DAILY LIVING?: NO

## 2025-05-13 SDOH — ECONOMIC STABILITY: TRANSPORTATION INSECURITY
IN THE PAST 12 MONTHS, HAS THE LACK OF TRANSPORTATION KEPT YOU FROM MEDICAL APPOINTMENTS OR FROM GETTING MEDICATIONS?: NO

## 2025-05-13 ASSESSMENT — SOCIAL DETERMINANTS OF HEALTH (SDOH)
WITHIN THE LAST YEAR, HAVE YOU BEEN KICKED, HIT, SLAPPED, OR OTHERWISE PHYSICALLY HURT BY YOUR PARTNER OR EX-PARTNER?: NO
WITHIN THE LAST YEAR, HAVE YOU BEEN AFRAID OF YOUR PARTNER OR EX-PARTNER?: NO
WITHIN THE LAST YEAR, HAVE YOU BEEN HUMILIATED OR EMOTIONALLY ABUSED IN OTHER WAYS BY YOUR PARTNER OR EX-PARTNER?: NO
WITHIN THE LAST YEAR, HAVE TO BEEN RAPED OR FORCED TO HAVE ANY KIND OF SEXUAL ACTIVITY BY YOUR PARTNER OR EX-PARTNER?: NO

## 2025-05-13 ASSESSMENT — PATIENT HEALTH QUESTIONNAIRE - PHQ9
SUM OF ALL RESPONSES TO PHQ9 QUESTIONS 1 AND 2: 0
1. LITTLE INTEREST OR PLEASURE IN DOING THINGS: NOT AT ALL
2. FEELING DOWN, DEPRESSED OR HOPELESS: NOT AT ALL

## 2025-05-13 ASSESSMENT — COLUMBIA-SUICIDE SEVERITY RATING SCALE - C-SSRS
1. IN THE PAST MONTH, HAVE YOU WISHED YOU WERE DEAD OR WISHED YOU COULD GO TO SLEEP AND NOT WAKE UP?: NO
6. HAVE YOU EVER DONE ANYTHING, STARTED TO DO ANYTHING, OR PREPARED TO DO ANYTHING TO END YOUR LIFE?: NO
2. HAVE YOU ACTUALLY HAD ANY THOUGHTS OF KILLING YOURSELF?: NO

## 2025-05-13 NOTE — PROGRESS NOTES
"Assessment/Plan   Problem List Items Addressed This Visit       Well woman exam - Primary    Overview   History  Age of menarche:  Last pap:  2023 NEG/NEG, next pap due 2028  History of abnormal: Remote history, no LEEP done  Mammogram: UTD  History of abnormal: Last one normal.  Has dense breasts  History of STI:  No  Contraception: Vasectomy    Sexual Health  Active with:   Pain: No  Lubrication: No  Orgasm: No    Family Cancer History  Breast: No  Ovarian: No  Endometrial: No  Colon: No    Social  Occupation: NP - [employer]  Lives with:   Alcohol < 7 drinks per week: Less  Tobacco/vaping: No    Screening tests age 45 or greater  Colon cancer screening:   Due 2034  Calcium CT Scoring:  ASCVD scoring:  Serum screenings up to date:  DEXA screening:      Safety/Education  Feels safe in home:Yes  Has been forced in sexual activity in last year: No  Calcium/Vitamin D supplementation - Calcium 1,200mg supplement or 300mg dietary per day plus 5,000u per day Vitamin D 3  Importance of adequate sleep: Minimum of 6 hours per night for heart health  Stress reduction/mindfulness:  Walks, lays down and listens to music                      BRAYAN Riavs     Subjective   Delmy Vo \"Chata\" is a 44 y.o. female who is here for a routine exam. Periods are regular every 28-30 days, lasting 6 days. Dysmenorrhea:moderate, occurring first 1-2 days of flow. Cyclic symptoms include breast tenderness, irritability, and moodiness. No intermenstrual bleeding, spotting, or discharge.    ROS      /87   Ht 1.575 m (5' 2\")   Wt 62.1 kg (137 lb)   LMP 05/02/2025   BMI 25.06 kg/m²     General:   Alert and oriented x 3   Heart:  Thyroid: Regular rate, rhythm  Euthyroid, normal shape and size   Lungs:  Breast: Clear to auscultation bilaterally  Symmetrical, no skin changes/nipple discharge, redness, tenderness, no masses palpated bilaterally   Abdomen: Soft, non tender   Vulva: EGBUS normal   Vagina: Pink, normal " discharge   Cervix: No CMT   Uterus: Normal shape, size   Adnexa: NT bilaterally       Thank you for coming to see me for your visit today and most importantly thank you for having a preventative visit.  If lab work was sent, you will see your test result via [employer]  Any prescriptions will be sent electronically to your pharmacy listed    I look forward to seeing you next year for your health care needs.  Please remember:   Sleep is important - make it a priority for at least 6 hours per night!   Exercise such as walking for 20 minutes per day is beneficial to your physical and mental health   Healthy diets can be expensive -- if you every feel like you are struggling to afford healthy food, please let me know as we have a very good program called [employer] that is available to you   Decrease alcohol and tobacco.  A goal of less than 7 alcoholic drinks per week is recommended for risk reduction of breast and colon cancer by 38%!    Be well!  Nadege

## 2025-07-30 ENCOUNTER — APPOINTMENT (OUTPATIENT)
Dept: NEUROLOGY | Facility: CLINIC | Age: 45
End: 2025-07-30
Payer: COMMERCIAL

## 2025-07-30 DIAGNOSIS — G51.32 HEMIFACIAL SPASM OF LEFT SIDE OF FACE: Primary | ICD-10-CM

## 2025-07-30 NOTE — PROGRESS NOTES
Chata Vo is an 45 y.o. female here for medical botulinum injection for hemifacial spasm. Patient requesting today to avoid the lower face due to smile asymmetry from last injections.        Review of Systems  All other system have been reviewed and are negative for complaint.     Neurological Exam     Physical ExamAlert oriented x4, extraocular in full, saccades normal, VOR normal, pursuit normal.  Some hemifacial spasm primarily affecting her left eyelid minimal extent to face today.  No weakness noted.  VOR intact.  Visual field full.  Power intact in all 4 extremities proximally and distally.  Sensations intact in all 4 extremities proximally and distally.  No abnormal involuntary limb movements gait unremarkable.  Reflexes symmetric.  -  Procedures  After consent procedure was completed and risk and benefits were explained we injected botulinum toxin using 13-gauge half an inch needle.  We used Xeomin.  Injected 7  units each at three different location on top of the both eyebrows that is above the upper edge of the eyebrow.  Injected 7 units on the lateral aspect of the lateral canthus on each side and 7 units on each  was injected. 7 units in inferior lateral aspectg of each eyes        Assessment/Plan  The patient is a 45-year-old woman with a history of hemifacial spasm here for botulinum toxin injections. She did well last time in regards to the face.  We injected upper face as noted above. We will follow through in 3 months.

## 2025-07-31 ENCOUNTER — APPOINTMENT (OUTPATIENT)
Facility: CLINIC | Age: 45
End: 2025-07-31
Payer: COMMERCIAL

## 2025-07-31 VITALS — HEIGHT: 62 IN | BODY MASS INDEX: 25.06 KG/M2

## 2025-07-31 DIAGNOSIS — M25.511 BILATERAL SHOULDER PAIN, UNSPECIFIED CHRONICITY: ICD-10-CM

## 2025-07-31 DIAGNOSIS — G89.29 CHRONIC THORACIC BACK PAIN, UNSPECIFIED BACK PAIN LATERALITY: Primary | ICD-10-CM

## 2025-07-31 DIAGNOSIS — M25.512 BILATERAL SHOULDER PAIN, UNSPECIFIED CHRONICITY: ICD-10-CM

## 2025-07-31 DIAGNOSIS — N62 MACROMASTIA: ICD-10-CM

## 2025-07-31 DIAGNOSIS — M54.6 CHRONIC THORACIC BACK PAIN, UNSPECIFIED BACK PAIN LATERALITY: Primary | ICD-10-CM

## 2025-07-31 PROCEDURE — 99204 OFFICE O/P NEW MOD 45 MIN: CPT | Performed by: PHYSICIAN ASSISTANT

## 2025-08-04 NOTE — PROGRESS NOTES
"    Department of Plastic and Reconstructive Surgery           Initial Office Consultation    Date: 2025  Referring Provider:  x2  Primary Care Provider: Georgina Campa MD    Subjective   Delmy Vo \"Yenny" is a 45 y.o. female who was self-referred for evaluation of back and shoulder pain secondary to macromastia.    She presents today to discuss ongoing back and shoulder pain. She endorses daily pain that increases with activity and with working and exercising. She endorses bra stap grooving from the weight of her breast. She has complaints of heaviness throughout the day of the breasts which is worsening her shoulder and neck pain. She has attempted conservative measures for her pain including heat/ice, PCP prescribed muscle relaxers, OTC pain medications including tylenol and ibuprofen for her pain without resolution. She has not attempted physical therapy, however she notes her  is a physical therapist and has attempted to help her with this. Her current bra size is a 32DDD. She has history of breast feeding.         PMH: non-contributory   Surgical Hx:  x2  Family Hx: non-contributory   Smoking: non-smoker, non-nicotine user      Review of Systems   All other systems have been reviewed with the patient and have been found to be negative with exception to the chief complaint as mentioned in the history of present illness.    ROS: As noted in history of present illness    - CONSTITUTIONAL: Denies weight loss, fever and chills.  - HEENT: Denies changes in vision and hearing.  - RESPIRATORY: Denies SOB and cough, difficulty breathing  - CV: Denies palpitations and CP  - GI: Denies abdominal pain, nausea, vomiting and diarrhea.  - : Denies dysuria and urinary frequency.  - MSK: positive for back, neck, and shoulder pain  - SKIN: Denies rash and pruritus.  - NEUROLOGICAL: Denies headache and syncope.      Objective   Vital Signs: There were no vitals filed for this visit.    Gen: " "interactive and pleasant  Head: NCAT  Eyes: EOMI, PERRLA  Mouth: MMM  Throat: trachea midline  Cor: RRR  Pulm: nonlabored breathing  Abd: s/nt/nd  Neuro: AAOx3  Ext: extremities perfused    Focused exam of the: breast                                R                  L  SN:NAC             27.5  cm         28  NAC:IMF            14.5  cm         14         BW                    13.5  cm          13.5        NAC diam         8.5   cm         7.5                                                                                           Ptosis Grade     2                 2                                                           Bra Size         32DDD  Body mass index is 25.06 kg/m².  Body surface area is 1.65 meters squared.    Imaging  Bilateral screening mammogram 2/7/2025  IMPRESSION:   There is no mammographic evidence of malignancy in either breast.     Routine screening mammogram is recommended. Annual mammogram will be due   in 1 year.     BI-RADS Category 1: Negative         Assessment/Plan     Delmy Vo \"Chata\" is a 45 y.o. female who was self-referred for evaluation of back and shoulder pain secondary to macromastia.    She undoubtedly has macromastia which is symptomatic causing her pain, bra strap grooving, trapezial hypertrophy. I am going to refer her to our physical therapy colleagues to address this nonsurgically first. We will have her follow up with our office after completion of physical therapy for reevaluation.     Symptom Y (yes)/N (no)   Headaches n   Neck Pain y   Shoulder Pain y   Upper Back Pain y   Painful kyphosis as documented by Xray n   Pain/discomfort/ulceration from bra straps cutting into shoulders y   Skin breakdown due to soft tissue infection from overlying breast tissue  n       She has undergone the following conservative measures with no relief:     Treatment  Y (yes)/N (no)   Medically supervised weight loss program for at least 3 months n   Dietary modifications and aerobic " exercise for at least 6 months  n   Seen an orthopedic or spine surgeon for spinal pain  n   Used dermatologic therapy for ulcers or refractory skin infections  n   Used specialty bras y   Used NSAIDs for pain relief  y   Participated in physical therapy n     She is a size 32DDD cup. She would ideally like to be a size C cup.    Body mass index is Body mass index is 25.06 kg/m².  Body surface area is Body surface area is 1.65 meters squared., according to Schnur Sliding Scale, the average breast tissue to be removed would be 338g , I believe this is achievable and will provide symptomatic relief.    Plan:   Referral to physical therapy   Follow up with Dr. Pisano after the completion of PT    I spent 45 minutes with this patient. Greater than 50% of this time was spent in the counselling and/or coordination of care of this patient.  This note was created using voice recognition software and was not corrected for typographical or grammatical errors.    Signature: Danica Hurtado PA-C

## 2025-11-05 ENCOUNTER — APPOINTMENT (OUTPATIENT)
Dept: NEUROLOGY | Facility: CLINIC | Age: 45
End: 2025-11-05
Payer: COMMERCIAL